# Patient Record
Sex: FEMALE | Race: WHITE | Employment: UNEMPLOYED | URBAN - METROPOLITAN AREA
[De-identification: names, ages, dates, MRNs, and addresses within clinical notes are randomized per-mention and may not be internally consistent; named-entity substitution may affect disease eponyms.]

---

## 2017-02-23 ENCOUNTER — APPOINTMENT (OUTPATIENT)
Dept: PHYSICAL THERAPY | Age: 42
End: 2017-02-23

## 2017-03-15 ENCOUNTER — HOSPITAL ENCOUNTER (OUTPATIENT)
Dept: PHYSICAL THERAPY | Age: 42
Discharge: HOME | End: 2017-03-15
Payer: COMMERCIAL

## 2017-03-15 PROCEDURE — 97110 THERAPEUTIC EXERCISES: CPT

## 2017-03-15 PROCEDURE — 97161 PT EVAL LOW COMPLEX 20 MIN: CPT

## 2017-03-15 NOTE — PROGRESS NOTES
PHYSICAL THERAPY - DAILY TREATMENT NOTE    Patient Name: Bessy Peters        Date: 3/15/2017  : 1975    Patient  Verified: YES  Visit #:     Insurance: Payor: Shailesh Jiménez / Plan: Nilesh Perez / Product Type: HMO /      In time: 500 Out time: 540   Total Treatment Time: 40     Medicare Time Tracking (below)   Total Timed Codes (min):   1:1 Treatment Time:       TREATMENT AREA/ DIAGNOSIS = Left shoulder pain [M25.512]    SUBJECTIVE  Pain Level (on 0 to 10 scale):    / 10   Medication Changes/New allergies or changes in medical history, any new surgeries or procedures?     NO    If yes, update Summary List   Subjective Functional Status/Changes:  []  No changes reported     Please see eval.      OBJECTIVE  Modalities Rationale: [] decrease edema/ inflammation  [] decrease pain   [] increase tissue extensibility  [] increase muscle performance  [] decrease neural compromise  to improve patient's ability to [] perform ADLs   [] ambulate  [] perform work  [] relaxation/ sleep      min [] Estim, type/location:                                      []  att     []  unatt     []  w/US     []  w/ice    []  w/heat    min []  Mechanical Traction: type/lbs                   []  pro   []  sup   []  int   []  cont    []  before manual    []  after manual    min []  Ultrasound, settings/location:      min []  Iontophoresis w/ dexamethasone, location:                                               []  take home patch       []  in clinic    min []  Ice     []  Heat    location/position:     min []  Vasopneumatic Device, press/temp:     min []  Other:    [] Skin assessment post-treatment (if applicable):    []  intact    []  redness- no adverse reaction     []redness  adverse reaction:        10  min Therapeutic Exercise:  [x]  See flow sheet   Rationale:  [x] increase ROM   [x] increase strength   [] increase endurance   [] increase motor control   [] other  to improve patients ability to [x] perform ADLs   [] ambulate  [] perform work  [] relaxation/ sleep       min Manual Therapy: Technique:         [] STM[]ASTM[]TPR[]PROM[] Stretching  []Jt manipulation []Gr I [] II []  III [] IV[] V[]  Treatment Area: Other:   Rationale: [] decrease pain   [] decrease TP [] increase ROM/ mobility   [] increase tissue extensibility   [] decrease edema   [] reduce disc [] postural correction   [] other     to improve patient's ability to [] perform ADLs   [] ambulate  [] perform work  [] relaxation/ sleep       min Therapeutic Activity:  [] see flow sheet   Rationale:[] increase ROM   [] increase strength   [] increase balance/ proprioception   [] increase motor control   [] other   to improve patients ability to [] perform ADLs   [] ambulate  [] perform work  [] relaxation/ sleep      min Neuromuscular Re-ed:  [] see flow sheet   Rationale:[] increase ROM   [] increase strength   [] increase balance/ proprioception   [] increase motor control  [] improve safety  [] other    to improve patients ability to[] perform ADLs   [] ambulate  [] perform work  [] relaxation/ sleep                min Gait Training: To improve patients ability to:    [] perform ADLs   [] ambulate       min Patient Education:  Yes    [x] Reviewed HEP   []  Progressed/Changed HEP based on: Other Objective/Functional Measures:    Please see POC. Post Treatment Pain Level (on 0 to 10) scale:   8  / 10     ASSESSMENT  []  See Progress Note/Recertification      Patient will continue to benefit from skilled PT services to:  PLEASE SEE POC/SET GOALS.    Progress toward goals / Updated goals:    [] decr pain   []inc stability   []inc balance [] inc ROM[] inc strength  [] centralizing symptoms                    [] progressing  Function  [] progressing towards LTGs            []  progressing HEP       PLAN  [x]  Upgrade activities as tolerated YES Continue plan of care   []  Discharge due to :    []  Other:      Therapist: Rebeka Burton, PT, DPT, MTC, CMTPT    Date: 3/15/2017 Time: 4:53 PM        Future Appointments  Date Time Provider Kailee Rodriguez   3/15/2017 5:00 PM Cally Maldonado PT RE[facility] AT [address]

## 2017-03-15 NOTE — PROGRESS NOTES
[facility] PHYSICAL THERAPY AT [facility] [address] [place], [address] - Phone: [phone]  Fax: [phone] / [address] [facility]  Patient Name: Paemla Bay : 1975   Medical   Diagnosis: Left shoulder pain [M25.512] Treatment Diagnosis: L shoulder pain   Onset Date: 2 months ago     Referral Source: Cher Williamson MD [place] [facility]): 3/15/2017   Prior Hospitalization: See medical history Provider #: [id number]   Prior Level of Function: Pain free reaching, carrying items   Comorbidities: Diabetes, asthma, HTN, see medical history/last PCP note   Medications: Verified on Patient Summary List   The Plan of Care and following information is based on the information from the initial evaluation.   ===========================================================================================  Assessment / key information:  The patient is a 35 y.o. female who presents to the clinic with main c/o L shoulder pain when reaching or lifting arm. Pain level is reported as 8/10 at max and 0/10 at best. Objective evaluation reveals: (1) FOTO=TBD. (2) R GHJ AROM WNL. L GHJ A/PROM: RTX=806 with pain/120 with pain, ABD=95 with pain/100 with pain, fxl IR=iliac crest with pain/WNL with pain at end range, fxl ER=occiput, [email or web address] deg ABD=45 with pain at end range. (3) ER strength 3+/5 with minimal pain, ABD 4+ but with max pain. 6 way GHJ isometrics painful when performed indpendently. (4) Special tests: pain with loading of L GHJ, (+) Neer's. (5) Tender to palpation of infraspinatus, UT, ant/lateral GHJ. UA to rule out mm tear at this point, possible MDI, s/s consistent with bursitis. Patient would benefit from trigger point dry needling and iontophoresis. These impairments are preventing patient from reaching, lifting, and carrying, limiting filing abilities at work.  The patient would benefit from skilled physical therapy services in order to return to PLOF.   ===========================================================================================  Eval Complexity: History MEDIUM  Complexity : 1-2 comorbidities / personal factors will impact the outcome/ POC ;  Examination  HIGH Complexity : 4+ Standardized tests and measures addressing body structure, function, activity limitation and / or participation in recreation ; Presentation LOW Complexity : Stable, uncomplicated ;  Decision Making Other outcome measures professional judgment  MEDIUM; Overall Complexity LOW   Problem List: pain affecting function, decrease ROM, decrease strength, edema affecting function, decrease ADL/ functional abilitiies and decrease activity tolerance FOTO = TBD  Treatment Plan may include any combination of the following: Therapeutic exercise, Therapeutic activities, Neuromuscular re-education, Physical agent/modality, Gait/balance training, Manual therapy, Patient education, Self Care training and Other: iontophoresis, and manual therapy including trigger point dry needling (TDN). Patient / Family readiness to learn indicated by: asking questions, trying to perform skills and interest  Persons(s) to be included in education: patient (P) and family support person (FSP);list NA  Barriers to Learning/Limitations: None  Measures taken: NA   Patient Goal (s): Pain relief   Patient self reported health status: fair  Rehabilitation Potential: good   Short Term Goals: To be accomplished in  2-3  weeks:  1. Establish HEP and consistent compliance with instructions. 2. Improve functional ability as evidenced by a score improvement of > or = to 6 points on FOTO. 3. Improve GHJ AROM to WNL with minimal to no pain at end range. 4. The patient will be able to perform 6 way GHJ isometrics for stability without pain.  Long Term Goals: To be accomplished in  4-6  weeks:  1.  The patient will be independent in HEP in preparation for discharge. 2. Improve functional ability as evidenced by a score improvement of > or = 10 points on FOTO. 3. Improve L shoulder strength to > or = 4+/5 for reaching, lifting, and carrying. 4. The patient will report waking no more than 4xweek during sleep due to pain. Frequency / Duration:   Patient to be seen  2  times per week for 4-6  weeks:  Patient / Caregiver education and instruction: self care, activity modification and exercises  G-Codes (GP): NA  Therapist Signature: Lashaun So PT, DELANOT, MTC, CMTPT Date: 9/68/3347   Certification Period: NA Time: 4:53 PM   ===========================================================================================  I certify that the above Physical Therapy Services are being furnished while the patient is under my care. I agree with the treatment plan and certify that this therapy is necessary. Physician Signature:        Date:       Time:     Please sign and return to [facility] or you may fax the signed copy to [phone]. Thank you.

## 2017-03-21 ENCOUNTER — HOSPITAL ENCOUNTER (OUTPATIENT)
Dept: PHYSICAL THERAPY | Age: 42
Discharge: HOME | End: 2017-03-21
Payer: COMMERCIAL

## 2017-03-21 PROCEDURE — 97110 THERAPEUTIC EXERCISES: CPT

## 2017-03-21 PROCEDURE — 97140 MANUAL THERAPY 1/> REGIONS: CPT
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
## 2017-03-23 ENCOUNTER — HOSPITAL ENCOUNTER (OUTPATIENT)
Dept: PHYSICAL THERAPY | Age: 42
End: 2017-03-23
Payer: COMMERCIAL

## 2017-03-28 ENCOUNTER — HOSPITAL ENCOUNTER (OUTPATIENT)
Dept: PHYSICAL THERAPY | Age: 42
Discharge: HOME | End: 2017-03-28
Payer: COMMERCIAL

## 2017-03-28 PROCEDURE — 97110 THERAPEUTIC EXERCISES: CPT

## 2017-03-28 PROCEDURE — 97140 MANUAL THERAPY 1/> REGIONS: CPT
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
## 2017-03-30 ENCOUNTER — HOSPITAL ENCOUNTER (OUTPATIENT)
Dept: PHYSICAL THERAPY | Age: 42
End: 2017-03-30
Payer: COMMERCIAL

## 2017-04-06 ENCOUNTER — APPOINTMENT (OUTPATIENT)
Dept: PHYSICAL THERAPY | Age: 42
End: 2017-04-06

## 2017-11-15 NOTE — PROGRESS NOTES
[address] [place] [address] PHYSICAL THERAPY AT [facility] [address] [place], [address]  Phone: [phone]  Fax: [phone] SUMMARY  Patient Name: Barbara Spencer : 1975   Treatment/Medical Diagnosis: Left shoulder pain [M25.512]   Referral Source: Larry Gonzalez MD     Date of Initial Visit: 3/15/17 Attended Visits: 3 Missed Visits: 4     SUMMARY OF TREATMENT  Initial evaluation and 2 treatment sessions of manual therapy and UE strengthening. CURRENT STATUS  The patient only attended 3 visits before failing to return to therapy, and has been discharged. RECOMMENDATIONS  Discontinue therapy due to lack of attendance or compliance. If you have any questions/comments please contact us directly at [phone]. Thank you for allowing us to assist in the care of your patient. Therapist Signature:  Amanda Feldman PT, MDT Date: 11/15/17     Time: 12:44 PM